# Patient Record
Sex: FEMALE | Race: WHITE | NOT HISPANIC OR LATINO | Employment: FULL TIME | URBAN - METROPOLITAN AREA
[De-identification: names, ages, dates, MRNs, and addresses within clinical notes are randomized per-mention and may not be internally consistent; named-entity substitution may affect disease eponyms.]

---

## 2018-03-05 DIAGNOSIS — Z00.00 ROUTINE GENERAL MEDICAL EXAMINATION AT A HEALTH CARE FACILITY: Primary | ICD-10-CM

## 2018-05-25 ENCOUNTER — HOSPITAL ENCOUNTER (OUTPATIENT)
Dept: RADIOLOGY | Facility: HOSPITAL | Age: 53
Discharge: HOME | End: 2018-05-25
Attending: INTERNAL MEDICINE

## 2018-05-25 ENCOUNTER — CLINICAL SUPPORT (OUTPATIENT)
Dept: INTERNAL MEDICINE | Facility: CLINIC | Age: 53
End: 2018-05-25
Attending: INTERNAL MEDICINE

## 2018-05-25 ENCOUNTER — HOSPITAL ENCOUNTER (OUTPATIENT)
Dept: CARDIOLOGY | Facility: CLINIC | Age: 53
Discharge: HOME | End: 2018-05-25
Attending: INTERNAL MEDICINE

## 2018-05-25 ENCOUNTER — OFFICE VISIT (OUTPATIENT)
Dept: OBSTETRICS AND GYNECOLOGY | Facility: CLINIC | Age: 53
End: 2018-05-25

## 2018-05-25 ENCOUNTER — OFFICE VISIT (OUTPATIENT)
Dept: PULMONOLOGY | Facility: CLINIC | Age: 53
End: 2018-05-25

## 2018-05-25 VITALS
SYSTOLIC BLOOD PRESSURE: 99 MMHG | HEIGHT: 64 IN | HEIGHT: 64 IN | DIASTOLIC BLOOD PRESSURE: 78 MMHG | BODY MASS INDEX: 27.77 KG/M2 | WEIGHT: 162.69 LBS | SYSTOLIC BLOOD PRESSURE: 124 MMHG | BODY MASS INDEX: 27.14 KG/M2 | WEIGHT: 159 LBS | HEART RATE: 71 BPM | DIASTOLIC BLOOD PRESSURE: 66 MMHG

## 2018-05-25 DIAGNOSIS — Z00.00 ROUTINE GENERAL MEDICAL EXAMINATION AT A HEALTH CARE FACILITY: Primary | ICD-10-CM

## 2018-05-25 DIAGNOSIS — Z01.419 WELL WOMAN EXAM WITH ROUTINE GYNECOLOGICAL EXAM: Primary | ICD-10-CM

## 2018-05-25 DIAGNOSIS — Z00.00 ROUTINE GENERAL MEDICAL EXAMINATION AT A HEALTH CARE FACILITY: ICD-10-CM

## 2018-05-25 DIAGNOSIS — Z00.00 ANNUAL PHYSICAL EXAM: Primary | ICD-10-CM

## 2018-05-25 DIAGNOSIS — N93.9 ABNORMAL UTERINE BLEEDING (AUB): ICD-10-CM

## 2018-05-25 LAB
ALBUMIN SERPL BCP-MCNC: 3.6 G/DL
ALP SERPL-CCNC: 58 U/L
ALT SERPL W/O P-5'-P-CCNC: 43 U/L
ANION GAP SERPL CALC-SCNC: 6 MMOL/L
AST SERPL-CCNC: 31 U/L
BILIRUB SERPL-MCNC: 0.5 MG/DL
BUN SERPL-MCNC: 10 MG/DL
CALCIUM SERPL-MCNC: 9.3 MG/DL
CHLORIDE SERPL-SCNC: 106 MMOL/L
CHOLEST SERPL-MCNC: 190 MG/DL
CHOLEST/HDLC SERPL: 3.3 {RATIO}
CO2 SERPL-SCNC: 29 MMOL/L
CREAT SERPL-MCNC: 0.6 MG/DL
DIASTOLIC DYSFUNCTION: NO
ERYTHROCYTE [DISTWIDTH] IN BLOOD BY AUTOMATED COUNT: 12.6 %
EST. GFR  (AFRICAN AMERICAN): >60 ML/MIN/1.73 M^2
EST. GFR  (NON AFRICAN AMERICAN): >60 ML/MIN/1.73 M^2
ESTIMATED AVG GLUCOSE: 91 MG/DL
GLUCOSE SERPL-MCNC: 86 MG/DL
HBA1C MFR BLD HPLC: 4.8 %
HCT VFR BLD AUTO: 39.4 %
HDLC SERPL-MCNC: 58 MG/DL
HDLC SERPL: 30.5 %
HGB BLD-MCNC: 13.2 G/DL
LDLC SERPL CALC-MCNC: 111.6 MG/DL
MCH RBC QN AUTO: 30.5 PG
MCHC RBC AUTO-ENTMCNC: 33.5 G/DL
MCV RBC AUTO: 91 FL
NONHDLC SERPL-MCNC: 132 MG/DL
PLATELET # BLD AUTO: 347 K/UL
PMV BLD AUTO: 8.7 FL
POTASSIUM SERPL-SCNC: 4.2 MMOL/L
PROT SERPL-MCNC: 6.5 G/DL
RBC # BLD AUTO: 4.33 M/UL
SODIUM SERPL-SCNC: 141 MMOL/L
TRIGL SERPL-MCNC: 102 MG/DL
TSH SERPL DL<=0.005 MIU/L-ACNC: 2.04 UIU/ML
WBC # BLD AUTO: 5.76 K/UL

## 2018-05-25 PROCEDURE — 77067 SCR MAMMO BI INCL CAD: CPT | Mod: 26,,, | Performed by: RADIOLOGY

## 2018-05-25 PROCEDURE — 87624 HPV HI-RISK TYP POOLED RSLT: CPT

## 2018-05-25 PROCEDURE — 77067 SCR MAMMO BI INCL CAD: CPT | Mod: TC

## 2018-05-25 PROCEDURE — 88175 CYTOPATH C/V AUTO FLUID REDO: CPT

## 2018-05-25 PROCEDURE — 80061 LIPID PANEL: CPT

## 2018-05-25 PROCEDURE — 99385 PREV VISIT NEW AGE 18-39: CPT | Mod: S$PBB,,, | Performed by: OBSTETRICS & GYNECOLOGY

## 2018-05-25 PROCEDURE — 85027 COMPLETE CBC AUTOMATED: CPT

## 2018-05-25 PROCEDURE — 99386 PREV VISIT NEW AGE 40-64: CPT | Mod: S$PBB,,, | Performed by: INTERNAL MEDICINE

## 2018-05-25 PROCEDURE — 83036 HEMOGLOBIN GLYCOSYLATED A1C: CPT

## 2018-05-25 PROCEDURE — 36415 COLL VENOUS BLD VENIPUNCTURE: CPT

## 2018-05-25 PROCEDURE — 80053 COMPREHEN METABOLIC PANEL: CPT

## 2018-05-25 PROCEDURE — 93018 CV STRESS TEST I&R ONLY: CPT | Mod: S$PBB,,, | Performed by: INTERNAL MEDICINE

## 2018-05-25 PROCEDURE — 93017 CV STRESS TEST TRACING ONLY: CPT | Mod: PBBFAC | Performed by: INTERNAL MEDICINE

## 2018-05-25 PROCEDURE — 97802 MEDICAL NUTRITION INDIV IN: CPT | Mod: S$GLB,,, | Performed by: INTERNAL MEDICINE

## 2018-05-25 PROCEDURE — 97750 PHYSICAL PERFORMANCE TEST: CPT | Mod: S$GLB,,, | Performed by: INTERNAL MEDICINE

## 2018-05-25 PROCEDURE — 99999 PR PBB SHADOW E&M-EST. PATIENT-LVL III: CPT | Mod: PBBFAC,,, | Performed by: INTERNAL MEDICINE

## 2018-05-25 PROCEDURE — 71046 X-RAY EXAM CHEST 2 VIEWS: CPT | Mod: TC,FY

## 2018-05-25 PROCEDURE — 99213 OFFICE O/P EST LOW 20 MIN: CPT | Mod: PBBFAC,25 | Performed by: OBSTETRICS & GYNECOLOGY

## 2018-05-25 PROCEDURE — 99213 OFFICE O/P EST LOW 20 MIN: CPT | Mod: PBBFAC,25,27 | Performed by: INTERNAL MEDICINE

## 2018-05-25 PROCEDURE — 99999 PR PBB SHADOW E&M-EST. PATIENT-LVL III: CPT | Mod: PBBFAC,,, | Performed by: OBSTETRICS & GYNECOLOGY

## 2018-05-25 PROCEDURE — 93016 CV STRESS TEST SUPVJ ONLY: CPT | Mod: S$PBB,,, | Performed by: INTERNAL MEDICINE

## 2018-05-25 PROCEDURE — 84443 ASSAY THYROID STIM HORMONE: CPT

## 2018-05-25 PROCEDURE — 71046 X-RAY EXAM CHEST 2 VIEWS: CPT | Mod: 26,,, | Performed by: RADIOLOGY

## 2018-05-25 RX ORDER — PRAMIPEXOLE DIHYDROCHLORIDE 0.25 MG/1
TABLET ORAL
COMMUNITY
Start: 2018-03-17 | End: 2024-03-22

## 2018-05-25 RX ORDER — LISDEXAMFETAMINE DIMESYLATE 60 MG/1
60 CAPSULE ORAL DAILY
Refills: 0 | COMMUNITY
Start: 2018-05-16

## 2018-05-25 NOTE — PROGRESS NOTES
History & Physical  Gynecology      SUBJECTIVE:     Chief Complaint: Well Woman       History of Present Illness:  Annual Exam-Premenopausal  Patient presents for annual exam. The patient has  complaints today of a change in her menstrual cycle. pain has worsened for the first two days and the bleeding is heavier.  Changes pads/tampons (uses both at the same time) and changes every couple of hours. Does have fatigue during cycles.  Recently has had very large blood clots.  Reports terrible cramps with cycles.  reports bleeding through clothes.  Menstrual cycles are otherwise monthly.  However, she had two times in the past two months when she bled between her cycles.  The patient is sexually active. GYN screening history: last pap: approximate date  and was normal. The patient participates in regular exercise: yes.  The patient does not smoke.  Mammogram is scheduled.      Contraception: none    FH:  Breast cancer: none  Colon cancer: none  Ovarian cancer: none    Review of patient's allergies indicates:   Allergen Reactions    Sulfa (sulfonamide antibiotics) Rash       Past Medical History:   Diagnosis Date    Restless leg syndrome      Past Surgical History:   Procedure Laterality Date    INNER EAR SURGERY      TUBAL LIGATION       OB History      Para Term  AB Living    3 3 3          SAB TAB Ectopic Multiple Live Births                     Family History   Problem Relation Age of Onset    Hypertension Father     Diabetes Father     Asthma Mother      Social History   Substance Use Topics    Smoking status: Never Smoker    Smokeless tobacco: Not on file    Alcohol use Yes      Comment: occassional       Current Outpatient Prescriptions   Medication Sig    pramipexole (MIRAPEX) 0.25 MG tablet pramipexole 0.25 mg tablet   Take 1 tablet every day by oral route at bedtime for 30 days.    VYVANSE 60 mg capsule Take 60 mg by mouth once daily.     No current facility-administered  medications for this visit.          Review of Systems:  Review of Systems   Constitutional: Negative for activity change, appetite change and fever.   Respiratory: Negative for shortness of breath.    Cardiovascular: Negative for chest pain.   Gastrointestinal: Negative for abdominal pain, constipation, diarrhea, nausea and vomiting.   Genitourinary: Positive for menorrhagia, menstrual problem and pelvic pain. Negative for vaginal bleeding, vaginal discharge and vaginal pain.   Neurological: Negative for numbness and headaches.   Breast: Negative for breast pain and nipple discharge       OBJECTIVE:     Physical Exam:  Physical Exam   Constitutional: She is oriented to person, place, and time. She appears well-developed and well-nourished.   Neck: Normal range of motion. Neck supple. No tracheal deviation present. No thyromegaly present.   Cardiovascular: Normal rate, regular rhythm and normal heart sounds.    Pulmonary/Chest: Effort normal and breath sounds normal. Right breast exhibits no inverted nipple, no mass, no nipple discharge, no skin change and no tenderness. Left breast exhibits no inverted nipple, no mass, no nipple discharge, no skin change and no tenderness. Breasts are symmetrical.   Abdominal: Soft.   Genitourinary: Vagina normal and uterus normal. No labial fusion. There is no rash, tenderness, lesion or injury on the right labia. There is no rash, tenderness, lesion or injury on the left labia. Uterus is not deviated, not enlarged, not fixed and not tender. Cervix exhibits no motion tenderness, no discharge and no friability. Right adnexum displays no mass, no tenderness and no fullness. Left adnexum displays no mass, no tenderness and no fullness. No erythema, tenderness or bleeding in the vagina. No foreign body in the vagina. No signs of injury around the vagina. No vaginal discharge found.   Neurological: She is alert and oriented to person, place, and time.   Psychiatric: She has a normal  mood and affect. Her behavior is normal. Judgment and thought content normal.   Nursing note and vitals reviewed.      Chaperoned by: Gemma    ASSESSMENT:       ICD-10-CM ICD-9-CM    1. Well woman exam with routine gynecological exam Z01.419 V72.31 Liquid-based pap smear, screening      HPV High Risk Genotypes, PCR   2. Abnormal uterine bleeding (AUB) N93.9 626.9           Plan:      Nathalia was seen today for well woman.    Diagnoses and all orders for this visit:    Well woman exam with routine gynecological exam  -     Liquid-based pap smear, screening  -     HPV High Risk Genotypes, PCR    Abnormal uterine bleeding (AUB)        Orders Placed This Encounter   Procedures    HPV High Risk Genotypes, PCR       Well Woman:  - Pap smear and HPV done today  - Birth control: btl  - GC/CT:n/a  - Mammogram: scheduled  - Smoking cessation: n/a  - Labs: none required   - Vaccines: none required  - Exercise counseling    AUB:  - long discussion about possible causes of change in bleeding pattern: adenomyosis, polyp, fibroids, uterine hyperplasia, uterine cancer  - I recommended an endometrial biopsy to be done in two weeks  - discussed possible options for treatment: progesterone pills, mirena, D&C +/- ablation, hysterectomy  - will discuss management at biopsy appointment.    Follow up in one year for annual or prn.    Mandy Minor

## 2018-05-25 NOTE — PROGRESS NOTES
Subjective:       Patient ID: Nathalia Staples is a 52 y.o. female.    Chief Complaint: Annual Exam    HPI  53 yo  for [employer]. Lives in [place]. She has a long hx of restless leg syndrome, takes mirapex with moderately good control of the problem. No other health issues. Surgeries: Tubal ligation and repair of ruptured ear drum. Has three grown children and adopted a child at age 4 who is now 7.  Review of Systems   Constitutional: Negative.    HENT: Negative.         Repair of a ruptured ear drum   Eyes: Negative.    Respiratory: Negative.    Cardiovascular: Negative.    Gastrointestinal: Negative.    Genitourinary: Negative.    Musculoskeletal: Negative.    Skin: Negative.    Neurological: Negative.         Long hx of restless leg syndrome.   Psychiatric/Behavioral: Negative.    All other systems reviewed and are negative.      Objective:      Physical Exam   Constitutional: She is oriented to person, place, and time. She appears well-developed and well-nourished. No distress.   HENT:   Head: Normocephalic and atraumatic.   Right Ear: External ear normal.   Left Ear: External ear normal.   Nose: Nose normal.   Mouth/Throat: Oropharynx is clear and moist.   Eyes: Conjunctivae and EOM are normal. Pupils are equal, round, and reactive to light.   Neck: Normal range of motion. Neck supple. No JVD present. No thyromegaly present.   Cardiovascular: Normal rate, regular rhythm, normal heart sounds and intact distal pulses.  Exam reveals no gallop.    No murmur heard.  Pulmonary/Chest: Breath sounds normal. No stridor. No respiratory distress. She has no wheezes. She has no rales. She exhibits no tenderness.   Peak flow 440 l/min   Abdominal: Soft. Bowel sounds are normal. She exhibits no distension and no mass. There is no tenderness. There is no rebound and no guarding.   Musculoskeletal: Normal range of motion. She exhibits no edema.   Lymphadenopathy:     She has no cervical adenopathy.    Neurological: She is alert and oriented to person, place, and time. She has normal reflexes. She displays normal reflexes. No cranial nerve deficit.   Skin: Skin is warm and dry. No rash noted.   Psychiatric: She has a normal mood and affect. Her behavior is normal. Judgment and thought content normal.   Nursing note and vitals reviewed.      Assessment:       No diagnosis found.    Plan:         Labs: All parameters are normal Chest x-ray is clear. Stress EKG is negative for ischemia and she got a Hoffman score of 8. Mammogram: Negative for malignancy.  Will return for endometrial biopsy in two week.   IMP: Healthy Female with Post Menopausal Bleeding.

## 2018-05-25 NOTE — PROGRESS NOTES
"Nutrition Assessment  Client name:  Nathalia Staples  :  1965  Age:  52 y.o.  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  Familiar with EH program although has not participated in the past due to eligibility.  Able to participate today, however, as she recently accepted a new position as  of [employer]  Shares that she is the first female to accept this position in the history of the company!  Is  with three grown children and one adopted daughter, age 7.  Originally from [place] and works in [place].  Has an unremarkable PMH with the exception of restless leg syndrome.  Is health conscious overall, sharing knowledge of healthy lifestyle behaviors.  Frequents a local gym with friends 4x/week yet admits to exercise inconsistencies.  Notes that she goes in "spurts" of exercising for 8-12 weeks followed by "laziness" for the following 8-12 weeks.  Follows a low carb diet, noting that she frequently skips lunch during the work day due to time constraints.  Fellow coworkers have encouraged her to improve such behavior, which she is "actively" working on.  Finds that regardless of meals or snacks packed, she does not take the time to stop and eat as she is being pulled in several directions.  Realizes the importance of self-care, however, noting that she often puts the needs and wants of others before herself.  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  Finds the weeks she does not visit her music room are those associated with heightened stress.  Adds that although she considers her food choices to be healthy (mostly lean protein + non-starchy vegetables), she drinks up to two Diet Cokes daily, which she feels helps to "get me through the day" although realizes they are not the best choice.  Currently, weighs 160# although is most comfortable at " "145-150#.  Unintentionally gained 10# this past summer, which she is confident will improve over the next few months.  Values her overall health, especially as she ages, expressing satisfaction and gratitude of EH program.      Past Medical History:   Diagnosis Date    Restless leg syndrome        Social History    Marital status:    Employment:   of [employer]  Social History   Substance Use Topics    Smoking status: Never Smoker    Smokeless tobacco: Never Used    Alcohol use Yes      Comment: occassional        Current medications:  has a current medication list which includes the following prescription(s): pramipexole and vyvanse.  Vitamins, minerals, and/or supplements:  None   Food allergies or intolerances:  NKFA     Food History  Breakfast:  Atkins protein bar + coffee + Diet Coke  Lunch:  Skips  Mid-afternoon Snack:  Diet Coke +/- nuts  Dinner:  "Lean meat + vegetables" + water    Exercise History:  60 minutes varied gym activities 4x/week    Lab Reports   Total Cholesterol:  190    Triglycerides:  102  HDL:  58  LDL:  111.6   Glucose:  86  HbA1c:  4.8%  BP:  99/66     Weight History  Height:  5' 4"     Weight:  160#  BMI:  27.5  % Body Fat:  33.33%    Diagnosis  RMR (Method:  Body [place]):  1300 kcal  Activity Factor:  1.4  DILCIA:  1820 - 250 = 1570 kcal    Overweight related to inadequate energy intake as evidenced by BMI:  27.5; 33.33% body fat.    Intervention    Goals:  1.  Achieve personal goal weight of 145-150#  2.  Continue current exercise regimen  3.  Avoid skipping lunch  4.  Continue current stress management techniques    Reviewed CMP, lipid panel, and HbA1c, praising patient for optimal lab values, physical activity, self-awareness, and overall, health conscious behaviors.  Discussed the potential health consequences of frequent meal skipping, encouraging patient to set aside time for lunch while at work.  Explained the individual roles of macronutrients within the diet in " addition to providing portable healthy snack choices.  Supported patient's desire for enhanced self-care, praising her for stress management techniques.      Handouts provided:  Meal Planning Guide  Restaurant Guide  Eat Fit Shopping List  Eat Fit Kelly  Fast Food Guide  Vitamin/Mineral Guide    Monitoring/Evaluation    Monitor the following:  Weight  BMI  % Body Fat  Caloric intake    Follow Up Plan:  Follow up with client in 1-2 years

## 2018-05-25 NOTE — LETTER
May 25, 2018      Dwain Hager MD  [address]  [facility] [address]           [facility] 5th Floor  [address]  [facility] [address]  Phone: [phone]          Patient: Nathalia Staples   MR Number: [id number]   YOB: 1965   Date of Visit: 5/25/2018       Dear Dr. Dwain Hager:    Thank you for referring Nathalia Staples to me for evaluation. Attached you will find relevant portions of my assessment and plan of care.    If you have questions, please do not hesitate to call me. I look forward to following Nathalia Staples along with you.    Sincerely,    Mandy Minor MD    Enclosure  CC:  No Recipients    If you would like to receive this communication electronically, please contact [email or web address] or [phone] to request more information on [facility] [employer] access.    For providers and/or their staff who would like to refer a patient to [facility], please contact us through our one-stop-shop provider referral line, [facility] , at [phone].    If you feel you have received this communication in error or would no longer like to receive these types of communications, please e-mail [email or web address]

## 2018-05-29 NOTE — PROGRESS NOTES
Subjective:       Patient ID: Nathalia Staples is a 52 y.o. female.    Chief Complaint: No chief complaint on file.    HPI   Patient has reported no previous history of cardiovascular or pulmonary disease.     Physical Limitations:   - None    Current Exercise Routine:   - Treadmill >20 minutes each session 4 days per week   - Rotates upper/lower body weight routine 4 days per week total    Patient is very friendly and a pleasure to consult. 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     Review of Systems    Objective:      The fitness evaluation results are as follows:    D.O.S. 5/25/2018   Height (in): 64   Weight (lbs): 160   BMI: 27.0291738   Body Fat (%): 33.33   Waist (cm): 78   Hip (cm): 113   WHR: 0.69   RBP (mmHg): 102/60   RHR (bpm): 82    Strength R (lbs)t: 60    Strength Lt (lbs): 64   Push-up Assessment: 25   Curl-up Assessment: 30   Flexibility Testing (cm): 49   REE (kcals): 1300     Physical Exam    Assessment:      Age/Gender Stratified Assessment:     Resting BP: Within Testing Limits   Body Fat %: Good   WHR Risk Factor: Low Risk    Strength R: Average    Strength L: Average   Upper Body Endurance: Excellent   Abdominal Endurance: Above Average   Lower body Flexibility: Excellent     1. Routine general medical examination at a health care facility        Plan:    Regular exercise routine should be continue and below guidelines should be met.     Recommended Fitness Guidelines:   - 150 minutes of moderate intensity aerobic exercise each week OR 75 minutes of vigorous    - 2-4 days per week of resistance training for each muscle group   - Daily stretching

## 2018-05-30 LAB
HPV16 AG SPEC QL: NEGATIVE
HPV16+18+H RISK 12 DNA CVX-IMP: NEGATIVE
HPV18 DNA SPEC QL NAA+PROBE: NEGATIVE

## 2024-02-12 DIAGNOSIS — Z00.00 ROUTINE MEDICAL EXAM: Primary | ICD-10-CM

## 2024-03-22 ENCOUNTER — HOSPITAL ENCOUNTER (OUTPATIENT)
Dept: CARDIOLOGY | Facility: HOSPITAL | Age: 59
Discharge: HOME | End: 2024-03-22
Attending: EMERGENCY MEDICINE

## 2024-03-22 ENCOUNTER — CLINICAL SUPPORT (OUTPATIENT)
Dept: INTERNAL MEDICINE | Facility: CLINIC | Age: 59
End: 2024-03-22

## 2024-03-22 ENCOUNTER — OFFICE VISIT (OUTPATIENT)
Dept: INTERNAL MEDICINE | Facility: CLINIC | Age: 59
End: 2024-03-22

## 2024-03-22 VITALS
BODY MASS INDEX: 23.29 KG/M2 | WEIGHT: 136.44 LBS | HEIGHT: 64 IN | HEART RATE: 85 BPM | SYSTOLIC BLOOD PRESSURE: 116 MMHG | DIASTOLIC BLOOD PRESSURE: 80 MMHG | OXYGEN SATURATION: 98 %

## 2024-03-22 VITALS — WEIGHT: 159 LBS | HEIGHT: 64 IN | BODY MASS INDEX: 27.14 KG/M2

## 2024-03-22 DIAGNOSIS — E78.00 HYPERCHOLESTEREMIA: ICD-10-CM

## 2024-03-22 DIAGNOSIS — Z00.00 ROUTINE MEDICAL EXAM: ICD-10-CM

## 2024-03-22 DIAGNOSIS — Z00.00 ENCOUNTER FOR SCREENING AND PREVENTATIVE CARE: Primary | ICD-10-CM

## 2024-03-22 DIAGNOSIS — Z00.00 ANNUAL PHYSICAL EXAM: Primary | ICD-10-CM

## 2024-03-22 DIAGNOSIS — Z86.59 HISTORY OF ATTENTION DEFICIT HYPERACTIVITY DISORDER (ADHD): ICD-10-CM

## 2024-03-22 DIAGNOSIS — Z00.00 ANNUAL PHYSICAL EXAM: ICD-10-CM

## 2024-03-22 PROBLEM — M25.549 HAND JOINT PAIN: Status: ACTIVE | Noted: 2024-03-22

## 2024-03-22 PROBLEM — F90.9 ATTENTION DEFICIT HYPERACTIVITY DISORDER (ADHD): Status: ACTIVE | Noted: 2024-03-22

## 2024-03-22 PROBLEM — F32.A DEPRESSIVE DISORDER: Status: ACTIVE | Noted: 2024-03-22

## 2024-03-22 PROBLEM — N39.0 URINARY TRACT INFECTIOUS DISEASE: Status: ACTIVE | Noted: 2024-03-22

## 2024-03-22 PROBLEM — G25.81 RESTLESS LEGS: Status: ACTIVE | Noted: 2024-03-22

## 2024-03-22 LAB
ALBUMIN SERPL BCP-MCNC: 3.9 G/DL (ref 3.5–5.2)
ALP SERPL-CCNC: 71 U/L (ref 55–135)
ALT SERPL W/O P-5'-P-CCNC: 11 U/L (ref 10–44)
ANION GAP SERPL CALC-SCNC: 8 MMOL/L (ref 8–16)
AST SERPL-CCNC: 13 U/L (ref 10–40)
BILIRUB SERPL-MCNC: 0.4 MG/DL (ref 0.1–1)
BUN SERPL-MCNC: 14 MG/DL (ref 6–20)
CALCIUM SERPL-MCNC: 9.4 MG/DL (ref 8.7–10.5)
CHLORIDE SERPL-SCNC: 107 MMOL/L (ref 95–110)
CHOLEST SERPL-MCNC: 225 MG/DL (ref 120–199)
CHOLEST/HDLC SERPL: 3.1 {RATIO} (ref 2–5)
CO2 SERPL-SCNC: 28 MMOL/L (ref 23–29)
CREAT SERPL-MCNC: 0.6 MG/DL (ref 0.5–1.4)
CV STRESS BASE HR: 66 BPM
DIASTOLIC BLOOD PRESSURE: 74 MMHG
ERYTHROCYTE [DISTWIDTH] IN BLOOD BY AUTOMATED COUNT: 11.9 % (ref 11.5–14.5)
EST. GFR  (NO RACE VARIABLE): >60 ML/MIN/1.73 M^2
ESTIMATED AVG GLUCOSE: 97 MG/DL (ref 68–131)
GLUCOSE SERPL-MCNC: 81 MG/DL (ref 70–110)
HBA1C MFR BLD: 5 % (ref 4–5.6)
HCT VFR BLD AUTO: 41.3 % (ref 37–48.5)
HCV AB SERPL QL IA: NORMAL
HDLC SERPL-MCNC: 72 MG/DL (ref 40–75)
HDLC SERPL: 32 % (ref 20–50)
HGB BLD-MCNC: 13.9 G/DL (ref 12–16)
HIV 1+2 AB+HIV1 P24 AG SERPL QL IA: NORMAL
LDLC SERPL CALC-MCNC: 139.4 MG/DL (ref 63–159)
MCH RBC QN AUTO: 30.5 PG (ref 27–31)
MCHC RBC AUTO-ENTMCNC: 33.7 G/DL (ref 32–36)
MCV RBC AUTO: 91 FL (ref 82–98)
NONHDLC SERPL-MCNC: 153 MG/DL
OHS CV CPX 1 MINUTE RECOVERY HEART RATE: 115 BPM
OHS CV CPX 85 PERCENT MAX PREDICTED HEART RATE MALE: 139
OHS CV CPX ESTIMATED METS: 14
OHS CV CPX MAX PREDICTED HEART RATE: 164
OHS CV CPX PATIENT IS FEMALE: 1
OHS CV CPX PATIENT IS MALE: 0
OHS CV CPX PEAK DIASTOLIC BLOOD PRESSURE: 74 MMHG
OHS CV CPX PEAK HEAR RATE: 171 BPM
OHS CV CPX PEAK RATE PRESSURE PRODUCT: NORMAL
OHS CV CPX PEAK SYSTOLIC BLOOD PRESSURE: 129 MMHG
OHS CV CPX PERCENT MAX PREDICTED HEART RATE ACHIEVED: 109
OHS CV CPX RATE PRESSURE PRODUCT PRESENTING: 8382
PLATELET # BLD AUTO: 322 K/UL (ref 150–450)
PMV BLD AUTO: 8.9 FL (ref 9.2–12.9)
POTASSIUM SERPL-SCNC: 3.9 MMOL/L (ref 3.5–5.1)
PROT SERPL-MCNC: 6.6 G/DL (ref 6–8.4)
RBC # BLD AUTO: 4.55 M/UL (ref 4–5.4)
SODIUM SERPL-SCNC: 143 MMOL/L (ref 136–145)
STRESS ECHO POST EXERCISE DUR MIN: 8 MINUTES
STRESS ECHO POST EXERCISE DUR SEC: 1 SECONDS
SYSTOLIC BLOOD PRESSURE: 127 MMHG
TRIGL SERPL-MCNC: 68 MG/DL (ref 30–150)
TSH SERPL DL<=0.005 MIU/L-ACNC: 2.02 UIU/ML (ref 0.4–4)
WBC # BLD AUTO: 4.8 K/UL (ref 3.9–12.7)

## 2024-03-22 PROCEDURE — 86803 HEPATITIS C AB TEST: CPT | Performed by: EMERGENCY MEDICINE

## 2024-03-22 PROCEDURE — 99999 PR PBB SHADOW E&M-EST. PATIENT-LVL IV: CPT | Mod: PBBFAC,,, | Performed by: EMERGENCY MEDICINE

## 2024-03-22 PROCEDURE — 83036 HEMOGLOBIN GLYCOSYLATED A1C: CPT | Performed by: EMERGENCY MEDICINE

## 2024-03-22 PROCEDURE — 85027 COMPLETE CBC AUTOMATED: CPT | Performed by: EMERGENCY MEDICINE

## 2024-03-22 PROCEDURE — 99211 OFF/OP EST MAY X REQ PHY/QHP: CPT | Mod: PBBFAC,25

## 2024-03-22 PROCEDURE — 36415 COLL VENOUS BLD VENIPUNCTURE: CPT | Performed by: EMERGENCY MEDICINE

## 2024-03-22 PROCEDURE — 93017 CV STRESS TEST TRACING ONLY: CPT

## 2024-03-22 PROCEDURE — 80061 LIPID PANEL: CPT | Performed by: EMERGENCY MEDICINE

## 2024-03-22 PROCEDURE — 87389 HIV-1 AG W/HIV-1&-2 AB AG IA: CPT | Performed by: EMERGENCY MEDICINE

## 2024-03-22 PROCEDURE — 99199 UNLISTED SPECIAL SVC PX/RPRT: CPT | Mod: ,,, | Performed by: INTERNAL MEDICINE

## 2024-03-22 PROCEDURE — 84443 ASSAY THYROID STIM HORMONE: CPT | Performed by: EMERGENCY MEDICINE

## 2024-03-22 PROCEDURE — 99211 OFF/OP EST MAY X REQ PHY/QHP: CPT | Mod: PBBFAC,27

## 2024-03-22 PROCEDURE — 99211 OFF/OP EST MAY X REQ PHY/QHP: CPT | Mod: S$PBB,,, | Performed by: INTERNAL MEDICINE

## 2024-03-22 PROCEDURE — 99386 PREV VISIT NEW AGE 40-64: CPT | Mod: S$PBB,,, | Performed by: EMERGENCY MEDICINE

## 2024-03-22 PROCEDURE — 99214 OFFICE O/P EST MOD 30 MIN: CPT | Mod: PBBFAC,25,27 | Performed by: EMERGENCY MEDICINE

## 2024-03-22 PROCEDURE — 93018 CV STRESS TEST I&R ONLY: CPT | Mod: ,,, | Performed by: INTERNAL MEDICINE

## 2024-03-22 PROCEDURE — 80053 COMPREHEN METABOLIC PANEL: CPT | Performed by: EMERGENCY MEDICINE

## 2024-03-22 PROCEDURE — 99999 PR PBB SHADOW E&M-EST. PATIENT-LVL I: CPT | Mod: PBBFAC,,,

## 2024-03-22 PROCEDURE — 93016 CV STRESS TEST SUPVJ ONLY: CPT | Mod: ,,, | Performed by: INTERNAL MEDICINE

## 2024-03-22 RX ORDER — GABAPENTIN 800 MG/1
800 TABLET ORAL NIGHTLY
COMMUNITY
Start: 2023-12-14

## 2024-03-22 NOTE — PROGRESS NOTES
[facility]      TODAY'S Date 3/22/2024  Patient ID: Nathalia Staples is a 58 y.o. female   MRN: [id number]  Primary Physician: Alexia, Primary Doctor    SUBJECTIVE       Chief Complaint:   Chief Complaint   Patient presents with    [facility]      HPI:   Reviewed medical, surgical, social and family history, medications, appropriate preventive health screenings, as well as vaccination history. Updates as noted below or in assessment and plan.    This is a very pleasant 58 y.o. nonsmoking female with PMHx RLS, ADHD. She is a new patient to me, presenting for an annual exam in [facility]. The patient is currently in good health, with the exception of being restless when she goes to sleep. Her sleep schedule varies, with bedtimes ranging from 10:00 p.m. to  2 a.m. and wake-up times sometimes at 4:30 a.m.. She reports no difficulty falling asleep but may stay up to complete tasks.  Although she denies napping or snoring, patient sometimes has trouble staying asleep due to restlessness.  She drinks 3 cups of coffee daily with last cup consumed by 9:00 a.m..  Once a week, patient will drink Selene or glass of wine.  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  She also is defending her dissertation for Ph.D program at [employer]. The patient has been  for 20 years.  She has 3 children from a previous relationship, 2 step children and a 13-year-old adopted child who currently lives with her and her .  She has 3 grandchildren with 1 on the way and 2 step grandchildren.  In her free time, she enjoys hanging out with her grandchildren, traveling, [employer] and playing piano.  The patient's goal for 2024 is enjoy music and learn more instruments such violin or guitar with grandchildren. She also finds it relaxing to stay busy. In terms of diet, the patient typically has Atkins bar, yogurt, or fruit.  Lunch usually consists of salad,  protein pack, top of pizza, or piece of meat from hamburger.  Dinners the same as lunch or pre-prepared meals delivered to the house.  The patient snacks on beef jerky, Atkins snack, or fruit.  The patient lost approximately 50 lb after having gastric sleeve in [place].  She tries to take the stairs for exercise otherwise does not do any formal training.  She has intermittent neck or back pain that she treats conservatively.  There is no report of hearing loss, tinnitus, noise exposure, cough, sneezing, dizziness, tingling, clumsiness, numbness, recent trauma, fever/chills, n/v/d/c, abdominal pain, IV drug use,  or loss of bowel or bladder control.     SCREENING:   Mammogram:    Due. H/o heterogeneously dense breast  Cervical cancer:   UTD, last 2022 per report  DEXA:     Due  Colonoscopy:    UTD, last 2022 per report  Depression:    negative   Anxiety:    Denies anxiety but feels stress with work - life balance  Eye Exam:    Due, uses readers  Dental Exam:    nicolas Mckeon teeth  Ear Exam:    Audiogram annually nl. h/o ear surgery for TM perforation  Skin:     rarely uses sunscreen. Negative for recent sunburn. Removed moles 3 years ago that were negative for cancer  Sex:     Monogamous relationship, contraception  none  Transportation safety:   Uses restraint consistently, does not drink alcohol before or while driving  Firearm safety:   Stored guns safely  Tobacco use:    None reported    A review of medical records indicates patient saw Psychiatry - Candace Reilly NP for ADHD    Immunization History   Administered Date(s) Administered    COVID-19, MRNA, LN-S, PF (MODERNA FULL 0.5 ML DOSE) 04/08/2021, 05/06/2021    Influenza - Trivalent (ADULT) 10/27/2023    Tdap 10/27/2023     Past Medical History:   Diagnosis Date    Restless leg syndrome      Past Surgical History:   Procedure Laterality Date    BREAST SURGERY      INNER EAR SURGERY      TUBAL LIGATION       Family History   Problem Relation Age of Onset     "Hypertension Father     Diabetes Father     Asthma Mother      Social History     Tobacco Use    Smoking status: Never     Passive exposure: Never    Smokeless tobacco: Never   Substance Use Topics    Alcohol use: Yes     Comment: occassional    Drug use: No     Past Medical, Surgical and Social history reviewed and verified by me.     Review of patient's allergies indicates:   Allergen Reactions    Sulfa (sulfonamide antibiotics) Rash       Current Outpatient Medications on File Prior to Visit   Medication Sig Dispense Refill    VYVANSE 60 mg capsule Take 60 mg by mouth once daily.  0    gabapentin (NEURONTIN) 800 MG tablet Take 800 mg by mouth every evening.       No current facility-administered medications on file prior to visit.       Review of Systems as per HPI  ROS  Constitutional: Negative for activity change, appetite change, fatigue, diaphoresis, chills and fever.   Respiratory: Negative for apnea, choking, chest tightness and wheezing.  Genitourinary: Negative for hematuria, flank pain, frequency and dysuria.   Skin: Negative for color change, pallor, rash and wound. Patient denies concerning moles or lesions.  Psychiatric/Behavioral: Negative for agitation, behavioral problems, confusion, decreased concentration and dysphoric mood.     All other systems reviewed and are negative.    OBJECTIVE     PHYSICAL EXAM  Vitals:    03/22/24 0930   BP: 116/80   Pulse: 85   SpO2: 98%   Weight: 61.9 kg (136 lb 7.4 oz)   Height: 5' 4" (1.626 m)     Vital Signs (Most Recent):  Pulse: 85 (03/22/24 0930)  BP: 116/80 (03/22/24 0930)  SpO2: 98 % (03/22/24 0930)   Weight:   Wt Readings from Last 1 Encounters:   03/22/24 72.1 kg (159 lb)     Body mass index is 23.42 kg/m².      Vital signs and nursing assessment noted:  Relatively normal vitals    GEN:   NAD, A & Ox3, atraumatic, well appearing, nontoxic appearing  HEENT:  PERRLA, EOMI, moist membranes, nl conjunctiva, no scleral icterus, no nystagmus, no nodes/nodules, " soft, supple, FROM, no trachial deviation, nexus negative  CV:   RRR no m/r/g, 2+ radial pulses, <2sec cap refill, no obvious JVD  RESP:  CTA B, no w/r/r, equal and bilateral chest rise, no respiratory distress  ABD:   soft, Nontender, Nondistended, +BS, no guarding/rebound  :   Deferred  BACK:  FROM, no midline tenderness, no paraspinal tenderness  EXT:   FROM, MCINTYER x 4, no swelling, no edema, no calf tenderness, no bony tenderness, no warmth or redness, no crepitus, no obvious deformity  LYMPH:  no gross adenopathy  NEURO:  GCS 15, CN II-XII grossly intact, no obvious motor/sensory deficit, no tremor, negative Romberg,  nl gait/coordination  PSYCH:   Cooperative, no SI/HI, positive anxiety, normal affect, nl judgement/thought process  SKIN:  Warm, dry, intact, no rashes/lesions or masses, nl color, no pallor    Tests    Exercise Stress - EKG 3/22/24 Conclusion       The ECG portion of the study is negative for ischemia.    The patient reported no chest pain during the stress test.    The blood pressure response to stress was normal.    There were no arrhythmias during stress.    The exercise capacity was above average.    Labs reviewed and independently interpreted. Imaging studies reviewed.   Recent Results (from the past 2016 hour(s))   Comprehensive metabolic panel    Collection Time: 03/22/24  8:13 AM   Result Value Ref Range    Sodium 143 136 - 145 mmol/L    Potassium 3.9 3.5 - 5.1 mmol/L    Chloride 107 95 - 110 mmol/L    CO2 28 23 - 29 mmol/L    Glucose 81 70 - 110 mg/dL    BUN 14 6 - 20 mg/dL    Creatinine 0.6 0.5 - 1.4 mg/dL    Calcium 9.4 8.7 - 10.5 mg/dL    Total Protein 6.6 6.0 - 8.4 g/dL    Albumin 3.9 3.5 - 5.2 g/dL    Total Bilirubin 0.4 0.1 - 1.0 mg/dL    Alkaline Phosphatase 71 55 - 135 U/L    AST 13 10 - 40 U/L    ALT 11 10 - 44 U/L    eGFR >60.0 >60 mL/min/1.73 m^2    Anion Gap 8 8 - 16 mmol/L   CBC Without Differential    Collection Time: 03/22/24  8:13 AM   Result Value Ref Range    WBC 4.80  3.90 - 12.70 K/uL    RBC 4.55 4.00 - 5.40 M/uL    Hemoglobin 13.9 12.0 - 16.0 g/dL    Hematocrit 41.3 37.0 - 48.5 %    MCV 91 82 - 98 fL    MCH 30.5 27.0 - 31.0 pg    MCHC 33.7 32.0 - 36.0 g/dL    RDW 11.9 11.5 - 14.5 %    Platelets 322 150 - 450 K/uL    MPV 8.9 (L) 9.2 - 12.9 fL   Lipid panel    Collection Time: 03/22/24  8:13 AM   Result Value Ref Range    Cholesterol 225 (H) 120 - 199 mg/dL    Triglycerides 68 30 - 150 mg/dL    HDL 72 40 - 75 mg/dL    LDL Cholesterol 139.4 63.0 - 159.0 mg/dL    HDL/Cholesterol Ratio 32.0 20.0 - 50.0 %    Total Cholesterol/HDL Ratio 3.1 2.0 - 5.0    Non-HDL Cholesterol 153 mg/dL   TSH    Collection Time: 03/22/24  8:13 AM   Result Value Ref Range    TSH 2.016 0.400 - 4.000 uIU/mL   Hemoglobin A1c    Collection Time: 03/22/24  8:13 AM   Result Value Ref Range    Hemoglobin A1C 5.0 4.0 - 5.6 %    Estimated Avg Glucose 97 68 - 131 mg/dL   HIV 1/2 Ag/Ab (4th Gen)    Collection Time: 03/22/24  8:13 AM   Result Value Ref Range    HIV 1/2 Ag/Ab Non-reactive Non-reactive   Hepatitis C Antibody    Collection Time: 03/22/24  8:13 AM   Result Value Ref Range    Hepatitis C Ab Non-reactive Non-reactive   Exercise Stress - EKG    Collection Time: 03/22/24  8:58 AM   Result Value Ref Range    85% Max Predicted      Max Predicted      [facility] CV CPX PATIENT IS MALE 0.0     [facility] CV CPX PATIENT IS FEMALE 1.0     HR at rest 66 bpm    Systolic blood pressure 127 mmHg    Diastolic blood pressure 74 mmHg    RPP 8,382     Exercise duration (min) 8 minutes    Exercise duration (sec) 1 seconds    Peak  bpm    Peak Systolic  mmHg    Peak Diatolic BP 74 mmHg    Peak RPP 22,059     Estimated METs 14     % Max HR Achieved 109     1 Minute Recovery  bpm       Latest Reference Range & Units 05/25/18 11:37   HPV High Risk type 16, PCR Negative  Negative   HPV High Risk type 18, PCR Negative  Negative   HPV other High Risk types, PCR Negative  Negative     Mammogram March 2018  Impression: No mammographic evidence of malignancy. BI-RADS Category 1: Negative    CXR March 2018 IMPRESSION: No acute abnormality.  ASSESSMENT:     1. Encounter for screening and preventative care    2. Annual physical exam    3. History of attention deficit hyperactivity disorder (ADHD)    4. Hypercholesteremia      58 y.o. female PMHx restless leg syndrome, ADHD on Vyvanse presents for annual exam in [facility].  Patient reports sense of restlessness in a busy schedule at home, school and work. Functionally, the patient does not report limitations due to her symptoms. Home medications and health maintenance reviewed.  Exam shows mild anxiety otherwise relatively benign.  No obvious skin lesions suspicious for malignancy noted. Immunization status: up to date and documented, missing doses of shingles. Scheduled cancer screenings: 1. colon and cervical stated as current, but no records available, 2. Mammogram: ORDERED BUT NOT SCHEDULED. he 10-year ASCVD risk score (Leana SOTELO, et al., 2019) is: 1.6%    MDM  Reviewed: previous chart, nursing note and vitals  Reviewed previous: labs (mammogram nl 2018. Exercise Stress - EKG 3/22/24 Conclusion The exercise capacity was above average.)  Interpretation: labs (Elevated cholesterol otherwise relatively unremarkable Lipid Panel, CMP, CBC, TSH, HgA1C, HIV, Hep C ab and HPV in 2018)      PLAN:     Periodic health maintenance visits annually or sooner with concerns. Consider audiograms every 3-5 years or sooner with concerns.   Patient to provide records from colon and cervical cancer screenings.  Routine labs CMP, CBC, Lipid, HgA1C, TSH.  Consider Dexa  Follow up with dentistry.     Orders Placed This Encounter   Procedures    Mammo Digital Diagnostic Bilat with [facility]    Ambulatory referral/consult to Gynecology    Ambulatory referral/consult to Optometry   Vaccinations to be obtained at local pharmacy or with medical provider of patient choosing.  Continue current  medications. Lipid-lowering therapy was not prescribed due to medical contraindication. No statin recommended because 10-year risk <5%.  Encouraged healthy eating to include 1200 mg calcium daily (total diet plus supplement) and 800 IU of Vit D daily, weight bearing exercise, yoga and avoidance of alcohol.  Recommend a high fiber, lean protein diet that is high in complex carbohydrates such as non-starchy vegetables and whole grains.   Recommend 150 minutes of moderate-intensity physical activity and 2 days of muscle strengthening activity weekly.  Recommend daily sun protection/avoidance, use of at least SPF 30, broad spectrum sunscreen (OTC drug), and routine physician surveillance to optimize early detection.  Recommend hearing protection when in noisy environments.     The results of physical exam findings, labs, and imaging were reviewed with the patient. Management of above assessments/visit diagnoses was discussed with patient. Precautions for return discussed at length. Patient was given ample time for questions. All questions asked and answered to the satisfaction of the patient. Patient is in agreement with the above and verbalized understanding. Total time spent caring for the patient today was 35 minutes. This includes time spent before the visit reviewing the chart, time spent during the visit, and time spent after the visit on documentation.    Shanell Goddard MD  [facility]    Disclaimer: This document was created using voice recognition software (M*Modal Fluency Direct). Although it may be edited, this document may contain errors related to incorrect recognition of the spoken word. Please contact the physician if clarification is needed.